# Patient Record
Sex: MALE | Race: BLACK OR AFRICAN AMERICAN
[De-identification: names, ages, dates, MRNs, and addresses within clinical notes are randomized per-mention and may not be internally consistent; named-entity substitution may affect disease eponyms.]

---

## 2020-09-20 ENCOUNTER — NURSE TRIAGE (OUTPATIENT)
Dept: OTHER | Facility: CLINIC | Age: 45
End: 2020-09-20

## 2020-09-21 NOTE — TELEPHONE ENCOUNTER
Reason for Disposition   [1] Numbness (i.e., loss of sensation) of the face, arm / hand, or leg / foot on one side of the body AND [2] sudden onset AND [3] brief (now gone)    Answer Assessment - Initial Assessment Questions  1. SYMPTOM: \"What is the main symptom you are concerned about? \" (e.g., weakness, numbness)    Numbness   2. ONSET: \"When did this start? \" (minutes, hours, days; while sleeping)     4 hours ago   3. LAST NORMAL: \"When was the last time you were normal (no symptoms)? \"      Never  4. PATTERN \"Does this come and go, or has it been constant since it started? \"  \"Is it present now? \"     When standing for a while, Yes   5. CARDIAC SYMPTOMS: \"Have you had any of the following symptoms: chest pain, difficulty breathing, palpitations? \"      None  6. NEUROLOGIC SYMPTOMS: \"Have you had any of the following symptoms: headache, dizziness, vision loss, double vision, changes in speech, unsteady on your feet? \"     None  7. OTHER SYMPTOMS: \"Do you have any other symptoms? \"     None   Caller stated his is having numbness in left leg and outer side of his thigh that started today around 4 hours ago when he was standing up. Caller was informed to be evaluated at the ED now and to call back for additional questions and agreed.     Protocols used: NEUROLOGIC DEFICIT-ADULT-